# Patient Record
Sex: MALE | Race: BLACK OR AFRICAN AMERICAN | NOT HISPANIC OR LATINO | Employment: FULL TIME | URBAN - METROPOLITAN AREA
[De-identification: names, ages, dates, MRNs, and addresses within clinical notes are randomized per-mention and may not be internally consistent; named-entity substitution may affect disease eponyms.]

---

## 2023-10-10 ENCOUNTER — APPOINTMENT (OUTPATIENT)
Dept: PRIMARY CARE | Facility: CLINIC | Age: 33
End: 2023-10-10
Payer: COMMERCIAL

## 2023-10-18 NOTE — PROGRESS NOTES
Subjective   Lasha Rodriguez is a 33 y.o. male who presents for here for follow-up physical Exam .  HPI  Lasha is 33 male history of asthma, Hodgkin's lymphoma and completed a total remission and recovery denies chest pain shortness of breath fever chills nausea vomiting constipation diarrhea patient is here for follow-up, voices no major minor complaint here for a physical exam.  Review of Systems  10 system review pertinent as above  Objective     Visit Vitals  /82   Pulse 78   Temp 36.3 °C (97.3 °F)   Resp 15      Physical Exam  HEENT: Atraumatic normocephalic the pupils are equal and round and reactive to light the sclerae nonicteric extraocular motion are intact.  Neck: Is supple without JVD no carotid bruits the trachea is midline there are no masses pulses are equal and bilateral with normal upstroke.  Skin: Normal.  Skin good texture.  Moist.  Good turgor.  No lesions, no rashes.  Lymph: No lymphadenopathy appreciated, no masses, no lesions  Lungs: Are clear to auscultation and percussion, good breath sounds bilaterally, no rhonchi, no wheezing, good diaphragmatic excursion.  Heart: Normal rate and normal rhythm S1, S2, no S3, no gallop, murmur or rub.  Abdomen: Soft, nontender, no organomegaly, good bowel sounds.    Extremities: Full range of motion, good pulses bilateral.  No cyanosis, no clubbing or edema.  Neuro: Cranial nerves II-XII are grossly intact there is no sensory or motor deficits.  Able to move all extremities.    Assessment/Plan     Here for follow-up    Fasting blood work  Basic metabolic  Lipid panel    Needs his family leave of absence  FMLA    History of Hodgkin's lymphoma  Nearly 12 years ago  In complete remission  Feeling well overall    Elevated BMI 37.96 kg meter square  Low-fat, low-cholesterol diet, exercise, daily  Ideal BMI is between 23 and 26 kg/m²  Low carbohydrate diet.    History of asthma  Situational from time to time  Overall doing well  Problem List Items Addressed  This Visit    None  Visit Diagnoses       Physical exam    -  Primary    Relevant Orders    Basic Metabolic Panel    Lipid Panel              Darrell Ivey MD

## 2023-10-23 ENCOUNTER — OFFICE VISIT (OUTPATIENT)
Dept: PRIMARY CARE | Facility: CLINIC | Age: 33
End: 2023-10-23
Payer: COMMERCIAL

## 2023-10-23 VITALS
HEIGHT: 70 IN | BODY MASS INDEX: 37.08 KG/M2 | DIASTOLIC BLOOD PRESSURE: 82 MMHG | RESPIRATION RATE: 15 BRPM | HEART RATE: 78 BPM | SYSTOLIC BLOOD PRESSURE: 122 MMHG | TEMPERATURE: 97.3 F | WEIGHT: 259 LBS

## 2023-10-23 DIAGNOSIS — C81.90 HODGKIN LYMPHOMA, UNSPECIFIED HODGKIN LYMPHOMA TYPE, UNSPECIFIED BODY REGION (MULTI): ICD-10-CM

## 2023-10-23 DIAGNOSIS — Z00.00 PHYSICAL EXAM: Primary | ICD-10-CM

## 2023-10-23 PROCEDURE — 99395 PREV VISIT EST AGE 18-39: CPT | Performed by: INTERNAL MEDICINE

## 2023-10-23 PROCEDURE — 80061 LIPID PANEL: CPT | Performed by: INTERNAL MEDICINE

## 2023-10-23 PROCEDURE — 3008F BODY MASS INDEX DOCD: CPT | Performed by: INTERNAL MEDICINE

## 2023-10-23 PROCEDURE — 80048 BASIC METABOLIC PNL TOTAL CA: CPT | Performed by: INTERNAL MEDICINE

## 2023-10-23 PROCEDURE — 1036F TOBACCO NON-USER: CPT | Performed by: INTERNAL MEDICINE

## 2023-10-23 ASSESSMENT — ENCOUNTER SYMPTOMS
LOSS OF SENSATION IN FEET: 0
OCCASIONAL FEELINGS OF UNSTEADINESS: 0
DEPRESSION: 0

## 2023-10-23 ASSESSMENT — PAIN SCALES - GENERAL: PAINLEVEL: 0-NO PAIN

## 2024-10-02 NOTE — PROGRESS NOTES
Subjective   Lasha Rodriguez is a 34 y.o. male who presents for here for follow-up physical Exam .  HPI  Lasha is 33 male history of asthma, Hodgkin's lymphoma and completed a total remission and recovery denies chest pain shortness of breath fever chills nausea vomiting constipation diarrhea patient is here for follow-up, voices no major minor complaint here for a physical exam.  Review of Systems  10 system review pertinent as above  Objective     Visit Vitals  /82   Pulse 78   Temp 37.1 °C (98.8 °F) (Temporal)   Resp 14        Physical Exam  HEENT: Atraumatic normocephalic the pupils are equal and round and reactive to light the sclerae nonicteric extraocular motion are intact.  Neck: Is supple without JVD no carotid bruits the trachea is midline there are no masses pulses are equal and bilateral with normal upstroke.  Skin: Normal.  Skin good texture.  Moist.  Good turgor.  No lesions, no rashes.  Lymph: No lymphadenopathy appreciated, no masses, no lesions  Lungs: Are clear to auscultation and percussion, good breath sounds bilaterally, no rhonchi, no wheezing, good diaphragmatic excursion.  Heart: Normal rate and normal rhythm S1, S2, no S3, no gallop, murmur or rub.  Abdomen: Soft, nontender, no organomegaly, good bowel sounds.    Extremities: Full range of motion, good pulses bilateral.  No cyanosis, no clubbing or edema.  Neuro: Cranial nerves II-XII are grossly intact there is no sensory or motor deficits.  Able to move all extremities.    Assessment/Plan     Here for follow-up    Fasting blood work  Basic metabolic  Lipid panel    Needs his family leave of absence  FMLA    History of Hodgkin's lymphoma  Nearly 12 years ago  In complete remission  Feeling well overall    Elevated BMI 37.96 kg meter square  Low-fat, low-cholesterol diet, exercise, daily  Ideal BMI is between 23 and 26 kg/m²  Low carbohydrate diet.    History of asthma  Situational from time to time  Overall doing well  Problem List  Items Addressed This Visit       Malignant lymphoma, Hodgkin's type (Multi)    Asthma - Primary    Relevant Medications    albuterol (ProAir HFA) 90 mcg/actuation inhaler    Class 2 obesity without serious comorbidity with body mass index (BMI) of 38.0 to 38.9 in adult           Darrell Ivey MD

## 2024-10-07 ENCOUNTER — APPOINTMENT (OUTPATIENT)
Dept: PRIMARY CARE | Facility: CLINIC | Age: 34
End: 2024-10-07
Payer: COMMERCIAL

## 2024-10-07 VITALS
DIASTOLIC BLOOD PRESSURE: 82 MMHG | TEMPERATURE: 98.8 F | WEIGHT: 269 LBS | SYSTOLIC BLOOD PRESSURE: 130 MMHG | HEART RATE: 78 BPM | RESPIRATION RATE: 14 BRPM | BODY MASS INDEX: 38.51 KG/M2 | HEIGHT: 70 IN

## 2024-10-07 DIAGNOSIS — J45.909 ASTHMA, UNSPECIFIED ASTHMA SEVERITY, UNSPECIFIED WHETHER COMPLICATED, UNSPECIFIED WHETHER PERSISTENT (HHS-HCC): ICD-10-CM

## 2024-10-07 DIAGNOSIS — E78.5 DYSLIPIDEMIA: ICD-10-CM

## 2024-10-07 DIAGNOSIS — E66.812 CLASS 2 OBESITY WITHOUT SERIOUS COMORBIDITY WITH BODY MASS INDEX (BMI) OF 38.0 TO 38.9 IN ADULT, UNSPECIFIED OBESITY TYPE: ICD-10-CM

## 2024-10-07 DIAGNOSIS — C81.90 HODGKIN LYMPHOMA, UNSPECIFIED HODGKIN LYMPHOMA TYPE, UNSPECIFIED BODY REGION (MULTI): Primary | ICD-10-CM

## 2024-10-07 LAB
ALT SERPL W P-5'-P-CCNC: 21 U/L (ref 14–59)
ANION GAP SERPL CALC-SCNC: 14 MMOL/L (ref 10–20)
AST SERPL W P-5'-P-CCNC: 14 U/L (ref 15–37)
BASOPHILS # BLD AUTO: 0.02 X10*3/UL (ref 0.1–1.6)
BASOPHILS NFR BLD AUTO: 0.31 % (ref 0–0.3)
BUN SERPL-MCNC: 14 MG/DL (ref 7–18)
CALCIUM SERPL-MCNC: 8.9 MG/DL (ref 8.5–10.1)
CHLORIDE SERPL-SCNC: 99 MMOL/L (ref 98–107)
CHOLEST SERPL-MCNC: 149 MG/DL (ref 0–199)
CHOLESTEROL/HDL RATIO: 2.9 (ref 4.2–7)
CO2 SERPL-SCNC: 25 MMOL/L (ref 21–32)
CREAT SERPL-MCNC: 0.79 MG/DL (ref 0.6–1.1)
EGFRCR SERPLBLD CKD-EPI 2021: >90 ML/MIN/1.73M*2
EOSINOPHIL # BLD AUTO: 0.23 X10*3/UL (ref 0.04–0.5)
EOSINOPHIL NFR BLD AUTO: 4.64 % (ref 0.7–7)
ERYTHROCYTE [DISTWIDTH] IN BLOOD BY AUTOMATED COUNT: 14.7 % (ref 11.5–14.5)
GLUCOSE SERPL-MCNC: 83 MG/DL (ref 74–100)
HCT VFR BLD AUTO: 41.9 % (ref 38.4–51.3)
HDLC SERPL-MCNC: 52 MG/DL (ref 40–59)
HGB BLD-MCNC: 14.07 G/DL (ref 12.7–17)
IS PATIENT FASTING: YES
LDLC SERPL DIRECT ASSAY-MCNC: 81 MG/DL (ref 0–100)
LYMPHOCYTES # BLD AUTO: 0.96 X10*3/UL (ref 0–6)
LYMPHOCYTES NFR BLD AUTO: 19.13 % (ref 20.5–51.1)
MCH RBC QN AUTO: 30.3 PG (ref 26–32)
MCHC RBC AUTO-ENTMCNC: 33.6 G/DL (ref 31–38)
MCV RBC AUTO: 90.4 FL (ref 80–96)
MONOCYTES # BLD AUTO: 0.6 X10*3/UL (ref 1.6–24.9)
MONOCYTES NFR BLD AUTO: 11.97 % (ref 1.7–9.3)
NEUTROPHILS # BLD AUTO: 3.21 X10*3/UL (ref 1.4–6.5)
NEUTROPHILS NFR BLD AUTO: 63.95 % (ref 42.2–75.2)
PLATELET # BLD AUTO: 269.1 X10*3/UL (ref 150–450)
PMV BLD AUTO: 9.35 FL (ref 7.8–11)
POTASSIUM SERPL-SCNC: 4.2 MMOL/L (ref 3.5–5.1)
RBC # BLD AUTO: 4.64 X10*6/UL (ref 4.1–5.6)
SODIUM SERPL-SCNC: 134 MMOL/L (ref 136–145)
TRIGL SERPL-MCNC: 58 MG/DL
WBC # BLD AUTO: 5.02 X10*3/UL (ref 4.5–10.5)

## 2024-10-07 PROCEDURE — 1036F TOBACCO NON-USER: CPT | Performed by: INTERNAL MEDICINE

## 2024-10-07 PROCEDURE — 80061 LIPID PANEL: CPT | Performed by: INTERNAL MEDICINE

## 2024-10-07 PROCEDURE — 80048 BASIC METABOLIC PNL TOTAL CA: CPT | Performed by: INTERNAL MEDICINE

## 2024-10-07 PROCEDURE — 84450 TRANSFERASE (AST) (SGOT): CPT | Performed by: INTERNAL MEDICINE

## 2024-10-07 PROCEDURE — 84460 ALANINE AMINO (ALT) (SGPT): CPT | Performed by: INTERNAL MEDICINE

## 2024-10-07 PROCEDURE — 99214 OFFICE O/P EST MOD 30 MIN: CPT | Performed by: INTERNAL MEDICINE

## 2024-10-07 PROCEDURE — 3008F BODY MASS INDEX DOCD: CPT | Performed by: INTERNAL MEDICINE

## 2024-10-07 PROCEDURE — 85025 COMPLETE CBC W/AUTO DIFF WBC: CPT

## 2024-10-07 RX ORDER — ALBUTEROL SULFATE 90 UG/1
2 INHALANT RESPIRATORY (INHALATION) EVERY 4 HOURS PRN
Qty: 8.5 G | Refills: 11 | Status: SHIPPED | OUTPATIENT
Start: 2024-10-07 | End: 2025-10-07

## 2024-10-07 ASSESSMENT — PATIENT HEALTH QUESTIONNAIRE - PHQ9
1. LITTLE INTEREST OR PLEASURE IN DOING THINGS: NOT AT ALL
2. FEELING DOWN, DEPRESSED OR HOPELESS: NOT AT ALL
SUM OF ALL RESPONSES TO PHQ9 QUESTIONS 1 AND 2: 0

## 2024-10-07 ASSESSMENT — PAIN SCALES - GENERAL: PAINLEVEL: 0-NO PAIN

## 2024-10-14 ENCOUNTER — APPOINTMENT (OUTPATIENT)
Dept: PRIMARY CARE | Facility: CLINIC | Age: 34
End: 2024-10-14
Payer: COMMERCIAL

## 2024-12-20 ENCOUNTER — TELEPHONE (OUTPATIENT)
Dept: PRIMARY CARE | Facility: CLINIC | Age: 34
End: 2024-12-20

## 2024-12-20 ENCOUNTER — TELEMEDICINE (OUTPATIENT)
Dept: PRIMARY CARE | Facility: CLINIC | Age: 34
End: 2024-12-20
Payer: COMMERCIAL

## 2024-12-20 DIAGNOSIS — B34.9 VIRAL ILLNESS: Primary | ICD-10-CM

## 2024-12-20 DIAGNOSIS — U07.1 COVID-19 VIRUS INFECTION: ICD-10-CM

## 2024-12-20 DIAGNOSIS — J20.9 ACUTE BRONCHITIS, UNSPECIFIED ORGANISM: Primary | ICD-10-CM

## 2024-12-20 PROCEDURE — 99213 OFFICE O/P EST LOW 20 MIN: CPT | Performed by: INTERNAL MEDICINE

## 2024-12-20 RX ORDER — AZITHROMYCIN 250 MG/1
TABLET, FILM COATED ORAL
Qty: 6 TABLET | Refills: 0 | Status: SHIPPED | OUTPATIENT
Start: 2024-12-20 | End: 2024-12-25

## 2024-12-20 RX ORDER — DEXAMETHASONE 4 MG/1
4 TABLET ORAL
Qty: 5 TABLET | Refills: 0 | Status: SHIPPED | OUTPATIENT
Start: 2024-12-20 | End: 2024-12-25

## 2024-12-20 NOTE — PROGRESS NOTES
Subjective   Lasha Rodriguez is a 34 y.o. male who presents for here for virtual visit tested positive to COVID   HPI  Lasha is 34 male history of asthma, Hodgkin's lymphoma and completed a total remission and recovery patient tested positive for COVID on December 20, 2024, he is symptomatic with sinus congestion sore throat sinus headaches and lack of taste.  Patient also admits to shortness of breath and wheezing.  Review of Systems  10 system review pertinent as above  Objective   Virtual visit  There were no vitals taken for this visit.       Physical Exam  Virtual visit    Assessment/Plan     Virtual visit    Tested positive to COVID  December 20, 2024  Patient is high risk  Paxlovid 3 tablets twice a day for 5 days  Decadron 4 mg once a day for 5 days  Fluids and rest  Tylenol for fever  If symptoms worsen go to the emergency room  Quarantine for 5 days starting December 20  Until December 24 providing symptoms.      History of Hodgkin's lymphoma  Nearly 12 years ago  In complete remission  Feeling well overall    Elevated BMI 37.96 kg meter square  Low-fat, low-cholesterol diet, exercise, daily  Ideal BMI is between 23 and 26 kg/m²  Low carbohydrate diet.    History of asthma  Situational from time to time  Overall doing well  Problem List Items Addressed This Visit    None  Visit Diagnoses       Viral illness    -  Primary    Relevant Medications    nirmatrelvir-ritonavir (Paxlovid) 300 mg (150 mg x 2)-100 mg tablet therapy pack    dexAMETHasone (Decadron) 4 mg tablet                Darrell Ivey MD

## 2025-02-18 ENCOUNTER — TELEMEDICINE (OUTPATIENT)
Dept: PRIMARY CARE | Facility: CLINIC | Age: 35
End: 2025-02-18
Payer: COMMERCIAL

## 2025-02-18 DIAGNOSIS — B34.9 VIRAL ILLNESS: ICD-10-CM

## 2025-02-18 DIAGNOSIS — J10.1 INFLUENZA A: Primary | ICD-10-CM

## 2025-02-18 PROCEDURE — 99213 OFFICE O/P EST LOW 20 MIN: CPT | Performed by: INTERNAL MEDICINE

## 2025-02-18 RX ORDER — OSELTAMIVIR PHOSPHATE 75 MG/1
75 CAPSULE ORAL 2 TIMES DAILY
Qty: 10 CAPSULE | Refills: 0 | Status: SHIPPED | OUTPATIENT
Start: 2025-02-18 | End: 2025-02-23

## 2025-02-18 NOTE — PROGRESS NOTES
Subjective   Lasha Rodriguez is a 34 y.o. male who presents for a virtual visit for tested positive for the flu.    HPI  Lasha is 34 male history of asthma, Hodgkin's lymphoma and completed a total remission and recovery patient tested positive for COVID on December 20, 2024, he is symptomatic with sinus congestion sore throat sinus headaches and lack of taste.  Patient also admits to shortness of breath and wheezing.  Tested positive for the flu was exposed to his wife also tested positive for influenza A.  Now complaining of bodyaches fever chills and overall feeling poorly.  He denies diarrhea and denies constipation admits to postnasal drip and a scratchy throat.  Review of Systems  10 system review pertinent as above  Objective   Virtual visit  There were no vitals taken for this visit.       Physical Exam  Virtual visit    Assessment/Plan     Influenza A  Tested positive exposed to wife with same  With bodyaches fever chills  Patient with history of asthma  Will start Tamiflu  1 Advil 3 times a day as needed for elevated temperature  May take Advil with food  Fluids and rest  You should probably stay home for the next 2 days  And plan to resume work on February 21, 2025  Fighting URI symptoms free  No fever no chills no cough.  Lasha voiced full understanding  Of all instructions      History of Hodgkin's lymphoma  Nearly 12 years ago  In complete remission  Feeling well overall    Elevated BMI 37.96 kg meter square  Low-fat, low-cholesterol diet, exercise, daily  Ideal BMI is between 23 and 26 kg/m²  Low carbohydrate diet.    History of asthma  Situational from time to time  Overall doing well  Problem List Items Addressed This Visit    None  Visit Diagnoses       Influenza A    -  Primary    Relevant Medications    oseltamivir (Tamiflu) 75 mg capsule                Darrell Ivey MD